# Patient Record
Sex: FEMALE | NOT HISPANIC OR LATINO | URBAN - METROPOLITAN AREA
[De-identification: names, ages, dates, MRNs, and addresses within clinical notes are randomized per-mention and may not be internally consistent; named-entity substitution may affect disease eponyms.]

---

## 2018-02-14 ENCOUNTER — OFFICE VISIT - HEALTHEAST (OUTPATIENT)
Dept: FAMILY MEDICINE | Facility: CLINIC | Age: 33
End: 2018-02-14

## 2018-02-14 DIAGNOSIS — Z00.00 ROUTINE GENERAL MEDICAL EXAMINATION AT A HEALTH CARE FACILITY: ICD-10-CM

## 2018-02-14 DIAGNOSIS — F41.1 GENERALIZED ANXIETY DISORDER: ICD-10-CM

## 2018-02-14 DIAGNOSIS — F50.819 BINGE EATING DISORDER: ICD-10-CM

## 2018-02-14 LAB
ANION GAP SERPL CALCULATED.3IONS-SCNC: 9 MMOL/L (ref 5–18)
BUN SERPL-MCNC: 8 MG/DL (ref 8–22)
CALCIUM SERPL-MCNC: 9.4 MG/DL (ref 8.5–10.5)
CHLORIDE BLD-SCNC: 106 MMOL/L (ref 98–107)
CHOLEST SERPL-MCNC: 175 MG/DL
CO2 SERPL-SCNC: 24 MMOL/L (ref 22–31)
CREAT SERPL-MCNC: 0.66 MG/DL (ref 0.6–1.1)
FASTING STATUS PATIENT QL REPORTED: YES
GFR SERPL CREATININE-BSD FRML MDRD: >60 ML/MIN/1.73M2
GLUCOSE BLD-MCNC: 98 MG/DL (ref 70–125)
HDLC SERPL-MCNC: 63 MG/DL
HGB BLD-MCNC: 12.6 G/DL (ref 12–16)
LDLC SERPL CALC-MCNC: 104 MG/DL
POTASSIUM BLD-SCNC: 4.4 MMOL/L (ref 3.5–5)
SODIUM SERPL-SCNC: 139 MMOL/L (ref 136–145)
TRIGL SERPL-MCNC: 39 MG/DL
TSH SERPL DL<=0.005 MIU/L-ACNC: 3.85 UIU/ML (ref 0.3–5)

## 2018-02-14 ASSESSMENT — MIFFLIN-ST. JEOR: SCORE: 1308.25

## 2018-02-15 ENCOUNTER — COMMUNICATION - HEALTHEAST (OUTPATIENT)
Dept: FAMILY MEDICINE | Facility: CLINIC | Age: 33
End: 2018-02-15

## 2019-03-17 ENCOUNTER — COMMUNICATION - HEALTHEAST (OUTPATIENT)
Dept: FAMILY MEDICINE | Facility: CLINIC | Age: 34
End: 2019-03-17

## 2021-06-01 VITALS — WEIGHT: 133.9 LBS | BODY MASS INDEX: 22.31 KG/M2 | HEIGHT: 65 IN

## 2021-06-16 PROBLEM — F41.1 GENERALIZED ANXIETY DISORDER: Status: ACTIVE | Noted: 2018-02-14

## 2021-06-16 PROBLEM — F50.819 BINGE EATING DISORDER: Status: ACTIVE | Noted: 2018-02-14

## 2021-06-16 NOTE — PROGRESS NOTES
Assessment/Plan:     1. Routine general medical examination at a health care facility  Routine history and physical, updated in EMR.  Fasting labs performed as below.  Pap smear is up to date, declines Tdap.  Plan repeat physical in 1 year.  - Hemoglobin  - Thyroid Cascade  - Basic Metabolic Panel  - Lipid Cascade FASTING    2. Generalized anxiety disorder  Doing well on current dose of fluoxetine per patient, however GAYATHRI-7 score remains above goal range.  Patient will establish with a therapist as below.  - Basic Metabolic Panel    3. Binge eating disorder  Referral placed to [facility].  Discussed healthy eating and snacking and physically removing herself from food stimuli, such as meeting up with a friend, going for a walk, etc. During her most triggering times.  - Ambulatory referral to Psychology      Subjective:      Caro Eastman is a 32 y.o. female who presents for an annual exam. The patient is sexually active. The patient participates in regular exercise: yes. The patient reports that there is not domestic violence in her life.     1. Has had depressive symptoms since childhood, started fluoxetine last year.  Has had some night sweats, some insomnia, but works really well and would like a refill.  Was previously seeing a psychologist in [facility].  This is where fluoxetine was started.  2. Was overweight as a child, has had issues with binge eating since childhood.  Will binge eat until she feels sick nearly daily.  Has eaten to the point of vomiting, but not because she wanted to rid herself of calories.  This has not happened for years.  Feels that she has gained 20 lbs over the past 3 months due to binge eating.    Healthy Habits:   Regular Exercise: Yes  Sunscreen Use: No  Healthy Diet: Yes - binge eating  Dental Visits Regularly: Yes  Seat Belt: Yes  Sexually active: Yes  Self Breast Exam Monthly:Yes  Lipid Profile: Yes  Glucose Screen: Yes        There is no immunization history on file  for this patient.  Immunization status: missing doses of Tdap, refuses.    No exam data present    Gynecologic History  Patient's last menstrual period was 02/10/2018.  Contraception: condoms  Last Pap: 7/14/16. Results were: normal      OB History   No data available       Current Outpatient Prescriptions   Medication Sig Dispense Refill     cetirizine (ZYRTEC) 10 MG tablet Take 10 mg by mouth daily.       CHOLECALCIFEROL, VITAMIN D3, (VITAMIN D3 ORAL) Take by mouth.       FLUoxetine (PROZAC) 20 MG capsule Take 40 mg by mouth daily.        LORATADINE ORAL Take by mouth.       VITAMIN B COMPLEX ORAL Take by mouth.       tretinoin (RETIN-A) 0.05 % cream APPLY SPARINGLY TO AFFECTED AREA(S) ONCE DAILY AT BEDTIME. 45 g 1     No current facility-administered medications for this visit.      No past medical history on file.  No past surgical history on file.  Sulfa (sulfonamide antibiotics)  No family history on file.  Social History     Social History     Marital status: Single     Spouse name: N/A     Number of children: N/A     Years of education: N/A     Occupational History     Not on file.     Social History Main Topics     Smoking status: Never Smoker     Smokeless tobacco: Never Used     Alcohol use Not on file     Drug use: Not on file     Sexual activity: Not on file     Other Topics Concern     Not on file     Social History Narrative       Review of Systems  General:  Denies problem  Eyes: Denies problem  Ears/Nose/Throat: Denies problem  Cardiovascular: Denies problem  Respiratory:  Denies problem  Gastrointestinal:  Denies problem  Genitourinary: Denies problem  Musculoskeletal:  Denies problem  Skin: Denies problem  Neurologic: Denies problem  Psychiatric: anxiety symptoms including primarily feeling nervous, also some feeling of doom; binge eating as above  Endocrine: Denies problem  Heme/Lymphatic: Denies problem   Allergic/Immunologic: Denies problem        Objective:         Vitals:    02/14/18 0916  "  BP: 110/76   Pulse: 100   Resp: 20   Weight: 133 lb 14.4 oz (60.7 kg)   Height: 5' 5\" (1.651 m)     Body mass index is 22.28 kg/(m^2).    Physical Exam:  General Appearance: Alert, cooperative, no distress, appears stated age  Head: Normocephalic, without obvious abnormality, atraumatic  Eyes: PERRL, conjunctiva/corneas clear, EOM's intact  Ears: Normal TM's and external ear canals, both ears  Nose: Nares normal, septum midline, mucosa normal, no drainage  Throat: Lips, mucosa, and tongue normal; teeth and gums normal  Neck: Supple, symmetrical, trachea midline, no adenopathy; thyroid: not enlarged, symmetric, no tenderness/mass/nodules  Back: Symmetric, no curvature, ROM normal, no CVA tenderness  Lungs: Clear to auscultation bilaterally, respirations unlabored  Breasts: No breast masses, tenderness, asymmetry, or nipple discharge  Heart: Regular rate and rhythm, S1 and S2 normal, no murmur, rub, or gallop  Abdomen: Soft, non-tender, bowel sounds active all four quadrants, no masses, no organomegaly  Pelvic:Not examined  Extremities: Extremities normal, atraumatic, no cyanosis or edema  Skin: Skin color, texture, turgor normal, no rashes or lesions  Lymph nodes: Cervical, supraclavicular, and axillary nodes normal  Neurologic: Normal        "

## 2021-06-25 NOTE — TELEPHONE ENCOUNTER
RN cannot approve Refill Request    RN can NOT refill this medication PCP messaged that patient is overdue for Office Visit.         Ligia Connor, [facility] Triage/Med Refill 3/19/2019    Requested Prescriptions   Pending Prescriptions Disp Refills     FLUoxetine (PROZAC) 20 MG capsule [Pharmacy Med Name: FLUOXETINE 20MG     CAP] 180 capsule 3     Sig: TAKE 2 CAPSULES BY MOUTH ONCE DAILY    [facility] Refill Protocol  Failed - 3/17/2019  8:40 AM       Failed - PCP or prescribing provider visit in last year    Last office visit with prescriber/PCP: Visit date not found OR same dept: Visit date not found OR same specialty: 6/22/2015 Jennifer Novak MD  Last physical: 2/14/2018 Last MTM visit: Visit date not found   Next visit within 3 mo: Visit date not found  Next physical within 3 mo: Visit date not found  Prescriber OR PCP: Vanita Bowen MD  Last diagnosis associated with med order: There are no diagnoses linked to this encounter.  If protocol passes may refill for 12 months if within 3 months of last provider visit (or a total of 15 months).

## 2021-06-25 NOTE — TELEPHONE ENCOUNTER
Patient called, left message on voicemail RX sent to pharmacy but patient needs to call and schedule a med check and or physical    Soraya Mora, CMA

## 2021-06-26 ENCOUNTER — HEALTH MAINTENANCE LETTER (OUTPATIENT)
Age: 36
End: 2021-06-26

## 2021-10-16 ENCOUNTER — HEALTH MAINTENANCE LETTER (OUTPATIENT)
Age: 36
End: 2021-10-16

## 2022-07-17 ENCOUNTER — HEALTH MAINTENANCE LETTER (OUTPATIENT)
Age: 37
End: 2022-07-17

## 2022-09-25 ENCOUNTER — HEALTH MAINTENANCE LETTER (OUTPATIENT)
Age: 37
End: 2022-09-25

## 2023-08-06 ENCOUNTER — HEALTH MAINTENANCE LETTER (OUTPATIENT)
Age: 38
End: 2023-08-06